# Patient Record
Sex: FEMALE | Race: WHITE | NOT HISPANIC OR LATINO
[De-identification: names, ages, dates, MRNs, and addresses within clinical notes are randomized per-mention and may not be internally consistent; named-entity substitution may affect disease eponyms.]

---

## 2019-09-24 ENCOUNTER — APPOINTMENT (OUTPATIENT)
Dept: OTOLARYNGOLOGY | Facility: CLINIC | Age: 34
End: 2019-09-24
Payer: COMMERCIAL

## 2019-09-24 VITALS
SYSTOLIC BLOOD PRESSURE: 120 MMHG | OXYGEN SATURATION: 96 % | WEIGHT: 185 LBS | HEIGHT: 67 IN | DIASTOLIC BLOOD PRESSURE: 86 MMHG | BODY MASS INDEX: 29.03 KG/M2 | HEART RATE: 75 BPM

## 2019-09-24 PROCEDURE — 31231 NASAL ENDOSCOPY DX: CPT

## 2019-09-24 PROCEDURE — 99204 OFFICE O/P NEW MOD 45 MIN: CPT | Mod: 25

## 2019-09-24 NOTE — PROCEDURE
[Recalcitrant Symptoms] : recalcitrant symptoms  [Anatomical Abnormality] : anatomical abnormality [Anterior rhinoscopy insufficient to account for symptoms] : anterior rhinoscopy insufficient to account for symptoms [Topical Lidocaine] : topical lidocaine [Oxymetazoline HCl] : oxymetazoline HCl [FreeTextEntry6] : Nasal endoscopy performed with surgery 4 mm rigid scope under local anesthesia to evaluate the airway and sinuses. Patient has bilateral deviation of the septum more to the right than left which is limiting her airway. No obvious purulent rhinorrhea noted.

## 2019-09-24 NOTE — ASSESSMENT
[FreeTextEntry1] : Patient is a 33-year-old female referred for sinusitis by Dr. Kee Menendez. Currently patient has had intermittent facial pain and sinusitis over the past several years. Such episodes have been treated with antibiotics in severe cases. She now was evaluated including an MRI which shows scattered opacification of the ethmoid sinus, septal deviation and mucosal thickening lining the maxillary sinus.\par \par At today's visit the head and neck was examined. This included nasal endoscopy which confirms deviation of the septum. No obvious active infection noted.\par \par As patient has imaging findings consistent with sinusitis we have decided to treat her for 3 weeks with Augmentin and a lesser course of steroids. She is to return in 4-5 weeks for CT scan of the sinuses to evaluate response to treatment. See him in the office also recommended.

## 2019-09-24 NOTE — REVIEW OF SYSTEMS
[Patient Intake Form Reviewed] : Patient intake form was reviewed [As Noted in HPI] : as noted in HPI [Negative] : Mouth and Throat

## 2019-10-28 ENCOUNTER — FORM ENCOUNTER (OUTPATIENT)
Age: 34
End: 2019-10-28

## 2019-10-29 ENCOUNTER — APPOINTMENT (OUTPATIENT)
Dept: CT IMAGING | Facility: HOSPITAL | Age: 34
End: 2019-10-29
Payer: COMMERCIAL

## 2019-10-29 ENCOUNTER — APPOINTMENT (OUTPATIENT)
Dept: OTOLARYNGOLOGY | Facility: CLINIC | Age: 34
End: 2019-10-29
Payer: COMMERCIAL

## 2019-10-29 ENCOUNTER — OUTPATIENT (OUTPATIENT)
Dept: OUTPATIENT SERVICES | Facility: HOSPITAL | Age: 34
LOS: 1 days | End: 2019-10-29
Payer: COMMERCIAL

## 2019-10-29 VITALS
BODY MASS INDEX: 29.03 KG/M2 | HEART RATE: 73 BPM | SYSTOLIC BLOOD PRESSURE: 118 MMHG | HEIGHT: 67 IN | WEIGHT: 185 LBS | DIASTOLIC BLOOD PRESSURE: 86 MMHG | OXYGEN SATURATION: 97 %

## 2019-10-29 DIAGNOSIS — Z78.9 OTHER SPECIFIED HEALTH STATUS: ICD-10-CM

## 2019-10-29 DIAGNOSIS — Z80.9 FAMILY HISTORY OF MALIGNANT NEOPLASM, UNSPECIFIED: ICD-10-CM

## 2019-10-29 PROCEDURE — 70486 CT MAXILLOFACIAL W/O DYE: CPT

## 2019-10-29 PROCEDURE — 99215 OFFICE O/P EST HI 40 MIN: CPT | Mod: 25

## 2019-10-29 PROCEDURE — 70486 CT MAXILLOFACIAL W/O DYE: CPT | Mod: 26

## 2019-10-29 PROCEDURE — 31231 NASAL ENDOSCOPY DX: CPT

## 2019-10-29 NOTE — CONSULT LETTER
[Dear  ___] : Dear ~ANDERSON, [Consult Letter:] : I had the pleasure of evaluating your patient, [unfilled]. [Referral Closing:] : Thank you very much for seeing this patient.  If you have any questions, please do not hesitate to contact me. [Sincerely,] : Sincerely, [FreeTextEntry2] : Melvin Wierderkehr, MD

## 2019-10-29 NOTE — ASSESSMENT
[FreeTextEntry1] : Patient returns after extensive treatment by us with both prednisone and antibiotics. She has persistent opacification of the left ethmoid, and frontal sinus which extends partially into the right frontal bone. Patient also has thickened mucous membrane lining the maxillary sinuses bilaterally and one opacified posterior right ethmoid cell was significant septal deviation. Above reviewed with patient and her mother. Given the refractory nature of this disease we would recommend the patient undergo endoscopic ethmoidectomy, bilateral antrostomies, left frontal sinusotomy and image guidance. Informed consent given. We'll wait scheduling with patient.

## 2019-10-29 NOTE — HISTORY OF PRESENT ILLNESS
[de-identified] : 33-year-old female 3 months post delivery note intermittent discomfort particularly over the left frontal region. She has had intermittent sinus discomfort in the past as part of evaluation for above had been told by other physicians this may be due to temporomandibular joint disease as to the current discomfort. No obvious recent history of purulent rhinorrhea. Treatment in the past as needed with antibiotics has been useful. Patient has not undergone prior imaging of the sinuses until his recent problem. Review of MRI shows opacification of several ethmoid cells and the lining of the maxillary sinus. Patient also has deviation of the septum. History is essentially within normal limits. [FreeTextEntry1] : 10--Patient returns after course of both antibiotics and prednisone. Patient had slight improvement in her facial pain at the end of treatment. She'll he thereafter she had return of her facial pain. Patient's CT imaging reviewed today and shows left frontal sinusitis extending into the right aspect of the frontal bone, opacification of the posterior right ethmoid sinus and bilateral ankles so thickening of the maxillary sinus. Patient also has significant deviation of the nasal septum.\par \par Following review of both endoscopy was performed of the nose bilaterally this confirms the septal deviation and patient does have erythema of the mucous membranes consistent with resolving or recurrent infection. No obvious purulent secretions seen.\par \par Given the above we have begun the patient on saline lavaged of her nose twice daily. We will leave her best option is to undergo endoscopic septoplasty, left frontal sinusotomy, bilateral antrostomies and possible right ethmoidectomy given the above endoscopic findings. Patient will also require left anterior ethmoidectomy given the above frontal disease.

## 2019-11-05 ENCOUNTER — APPOINTMENT (OUTPATIENT)
Dept: OTOLARYNGOLOGY | Facility: CLINIC | Age: 34
End: 2019-11-05
Payer: COMMERCIAL

## 2019-11-05 VITALS
BODY MASS INDEX: 28.5 KG/M2 | WEIGHT: 181.6 LBS | HEART RATE: 76 BPM | SYSTOLIC BLOOD PRESSURE: 124 MMHG | HEIGHT: 67 IN | DIASTOLIC BLOOD PRESSURE: 83 MMHG

## 2019-11-05 PROCEDURE — 31231 NASAL ENDOSCOPY DX: CPT

## 2019-11-05 PROCEDURE — 99214 OFFICE O/P EST MOD 30 MIN: CPT | Mod: 25

## 2019-11-05 NOTE — CONSULT LETTER
[Dear  ___] : Dear  [unfilled], [Courtesy Letter:] : I had the pleasure of seeing your patient, [unfilled], in my office today. [Consult Closing:] : Thank you very much for allowing me to participate in the care of this patient.  If you have any questions, please do not hesitate to contact me. [Sincerely,] : Sincerely, [FreeTextEntry3] : Kayode Pineda MD\par Department of Otolaryngology - Head and Neck Surgery\par [facility]

## 2019-11-05 NOTE — DATA REVIEWED
[de-identified] : CT Sinus 10/29/19:\par FINDINGS: \par \par POSTOPERATIVE or POST TRAUMATIC CHANGE: None. \par \par \par SINOCRANIAL AND SINOORBITAL JUNCTIONS: \par The bones adjacent to the sinuses, including the lamina papyracea, \par cribriform plate and fovea ethmoidalis, are intact. \par \par FRONTAL SINUSES, DRAINAGE PATHWAYS AND ASSOCIATED AREAS: \par The right frontal sinus is hypoplastic. There is near complete opacification \par with aerated in the left frontal sinus which extends to the region of the \par right frontal sinus. The left frontal recess is opacified. \par \par NASAL SEPTUM: \par The nasal septum is deviated to the right. \par \par NASAL CAVITY: \par The nasal cavity is well-aerated. \par \par ETHMOID SINUSES: \par There are aerated secretions within the posterior right ethmoid air cell. \par The remainder of the ethmoid air cells are well aerated. \par \par MAXILLARY SINUSES, DRAINAGE PATHWAYS AND ASSOCIATED AREAS: \par There is mild mucosal thickening of the bilateral maxillary sinuses. \par \par The bilateral ostial-meatal units are patent but narrowed by mucosal \par thickening.. \par \par SPHENOID SINUSES, DRAINAGE PATHWAYS AND ASSOCIATED AREAS: \par The bilateral sphenoid sinuses are well aerated. The bilateral sphenoidal \par recesses are patent. The sphenoid septum is deviated to the right. \par \par OTHER: \par Evaluation of the nasopharynx demonstrates no masses. \par \par The mastoid air cells are well aerated. There is no abnormal opacification \par of the tympanic cavities. \par \par Visualized intraorbital compartments, and deep spaces of the suprahyoid neck \par are within normal limits. \par \par Limited evaluation of the brain parenchyma demonstrates no abnormalities. \par \par IMPRESSION: \par \par Left frontal and right posterior ethmoid sinus disease. Obstruction of the \par left frontal recess. \par \par The nasal septum is deviated to the right. \par

## 2019-11-05 NOTE — PROCEDURE
[FreeTextEntry3] : Nasal Endoscopy\par severe right septal deviation\par turbinate hypertrophy\par bulbous left middle turbinate w polypoid mucosal edema\par no mucopus

## 2019-11-05 NOTE — ASSESSMENT
[FreeTextEntry1] : 33F here for initial evaluation. Three months ago, she developed pain over the left frontal region. This involved both sides, but the left was more painful, worse with pressure over her eyebrows/above eyes. The headaches persisted with dull pressure/pain and MRI showed severe left frontal sinus disease (in addition to mild mucosal disease in bilateral maxillary/ethmoid sinuses). She was then seen by ENT who prescribed abx/steroids (cipro/prednisone) and her sx improved, only to recur after the medication finished. There was no green rhinorrhea, or nasal obstruction/congestion. CT sinus done after treatment shows an opacified left frontal sinus and outflow tract; the left frontal sinus pneumatizes into the right frontal bone as well, with a separate hypoplastic right frontal sinus; there is mild bilateral (right>left) maxillary mucosal disease and mild right posterior ethmoid mucosal thickening and severe rightward septal deviation.\par On exam, nasal endoscopy shows severe right septal deviation with a bulbous left middle turbinate with polypoid mucosal edema. The rest of the head and neck exam is unremarkable.\par Frontal pain due to chronic left frontal sinusitis. The right side is also affected since the left frontal sinus extends to the right side. Given the CT, the left frontal outflow tract is anatomically very narrowed due to a large ager nasi cell and suprabullar cell as well.\par She remains symptomatic despite abx/steroids and post-treatment CT continues to show disease. At this point, I would recommend sinus surgery - this would entail left frontal sinusotomy, in addition to bilateral maxillary antrostomies/limited ethmoidectomies and septoplasty for access. This was discussed at length and all questions answered. Plan for OR in the next 6-7 weeks or so. All questions answered. Should sx worsen, will give additional abx/steroids.

## 2019-11-05 NOTE — HISTORY OF PRESENT ILLNESS
[de-identified] : 33F here for initial evaluation.\par \par Three months ago, she developed pain over the left frontal region. This involved both sides, but the left was more painful, worse with pressure over her eyebrows/above eyes. The headaches persisted with dull pressure/pain after which MRI showed severe left frontal sinus disease (in addition to mild mucosal disease in bilateral maxillary/ethmoid sinuses). She was then seen by ENT who prescribed abx/steroids (cipro/prednisone) and her sx dramatically improved, only to recur after the medication finished. CT then done, as below.\par There was no green rhinorrhea, or nasal obstruction/congestion.\par She is here in 2nd opinion, for further evaluation. The frontal pain is impacting her daily life.\par \par CT Sinus 10/29/19:\par opacified left frontal sinus and outflow tract; the sinus extends into the left frontal bone as well, with a separate hypoplastic right frontal sinus; mild bilateral (right>left) maxillary mucosal disease and mild right posterior ethmoid mucosal thickening. Rightward septal deviation\par \par ROS othwerwise unremarkable.

## 2019-12-12 ENCOUNTER — OUTPATIENT (OUTPATIENT)
Dept: OUTPATIENT SERVICES | Facility: HOSPITAL | Age: 34
LOS: 1 days | Discharge: HOME | End: 2019-12-12
Payer: COMMERCIAL

## 2019-12-12 ENCOUNTER — APPOINTMENT (OUTPATIENT)
Dept: OTOLARYNGOLOGY | Facility: HOSPITAL | Age: 34
End: 2019-12-12

## 2019-12-12 ENCOUNTER — RESULT REVIEW (OUTPATIENT)
Age: 34
End: 2019-12-12

## 2019-12-12 PROCEDURE — 31256 EXPLORATION MAXILLARY SINUS: CPT | Mod: 50

## 2019-12-12 PROCEDURE — 31287 NASAL/SINUS ENDOSCOPY SURG: CPT | Mod: LT

## 2019-12-12 PROCEDURE — 30520 REPAIR OF NASAL SEPTUM: CPT

## 2019-12-12 PROCEDURE — 88305 TISSUE EXAM BY PATHOLOGIST: CPT | Mod: 26

## 2019-12-12 PROCEDURE — 88300 SURGICAL PATH GROSS: CPT | Mod: 26,59

## 2019-12-12 PROCEDURE — 30140 RESECT INFERIOR TURBINATE: CPT | Mod: 50

## 2019-12-12 PROCEDURE — 88311 DECALCIFY TISSUE: CPT | Mod: 26

## 2019-12-12 PROCEDURE — 61782 SCAN PROC CRANIAL EXTRA: CPT

## 2019-12-12 PROCEDURE — 31253 NSL/SINS NDSC TOTAL: CPT | Mod: 50

## 2019-12-19 ENCOUNTER — APPOINTMENT (OUTPATIENT)
Dept: OTOLARYNGOLOGY | Facility: CLINIC | Age: 34
End: 2019-12-19
Payer: COMMERCIAL

## 2019-12-19 PROCEDURE — 31237 NSL/SINS NDSC SURG BX POLYPC: CPT | Mod: 58

## 2019-12-19 PROCEDURE — 99024 POSTOP FOLLOW-UP VISIT: CPT

## 2019-12-19 NOTE — HISTORY OF PRESENT ILLNESS
[de-identified] : 34F here in first postoperative visit s/p SMR/ESS (maxillary, ethmoid, left sphenoid, frontal) 12/12/19 for chronic sinusitis and nasal obstruction.\par \par She is doing alright since surgery. She feels very congested with slight headache and has had intermittent bleeding, a bit more yesterday from the right side. She took the medication as prescribed.\par \par Pathology: pending\par \par ROS otherwise unremarkable

## 2019-12-19 NOTE — DATA REVIEWED
[de-identified] : CT Sinus 10/29/19:\par FINDINGS: \par \par POSTOPERATIVE or POST TRAUMATIC CHANGE: None. \par \par \par SINOCRANIAL AND SINOORBITAL JUNCTIONS: \par The bones adjacent to the sinuses, including the lamina papyracea, \par cribriform plate and fovea ethmoidalis, are intact. \par \par FRONTAL SINUSES, DRAINAGE PATHWAYS AND ASSOCIATED AREAS: \par The right frontal sinus is hypoplastic. There is near complete opacification \par with aerated in the left frontal sinus which extends to the region of the \par right frontal sinus. The left frontal recess is opacified. \par \par NASAL SEPTUM: \par The nasal septum is deviated to the right. \par \par NASAL CAVITY: \par The nasal cavity is well-aerated. \par \par ETHMOID SINUSES: \par There are aerated secretions within the posterior right ethmoid air cell. \par The remainder of the ethmoid air cells are well aerated. \par \par MAXILLARY SINUSES, DRAINAGE PATHWAYS AND ASSOCIATED AREAS: \par There is mild mucosal thickening of the bilateral maxillary sinuses. \par \par The bilateral ostial-meatal units are patent but narrowed by mucosal \par thickening.. \par \par SPHENOID SINUSES, DRAINAGE PATHWAYS AND ASSOCIATED AREAS: \par The bilateral sphenoid sinuses are well aerated. The bilateral sphenoidal \par recesses are patent. The sphenoid septum is deviated to the right. \par \par OTHER: \par Evaluation of the nasopharynx demonstrates no masses. \par \par The mastoid air cells are well aerated. There is no abnormal opacification \par of the tympanic cavities. \par \par Visualized intraorbital compartments, and deep spaces of the suprahyoid neck \par are within normal limits. \par \par Limited evaluation of the brain parenchyma demonstrates no abnormalities. \par \par IMPRESSION: \par \par Left frontal and right posterior ethmoid sinus disease. Obstruction of the \par left frontal recess. \par \par The nasal septum is deviated to the right. \par

## 2019-12-19 NOTE — CONSULT LETTER
[Dear  ___] : Dear  [unfilled], [Consult Closing:] : Thank you very much for allowing me to participate in the care of this patient.  If you have any questions, please do not hesitate to contact me. [Courtesy Letter:] : I had the pleasure of seeing your patient, [unfilled], in my office today. [Sincerely,] : Sincerely, [FreeTextEntry3] : Kayode Pineda MD\par Department of Otolaryngology - Head and Neck Surgery\par [facility]

## 2019-12-19 NOTE — ASSESSMENT
[FreeTextEntry1] : 34F here in first postoperative visit s/p SMR/ESS (maxillary, ethmoid, left sphenoid, frontal) 12/12/19 for chronic sinusitis and nasal obstruction. She is doing alright since surgery. She feels very congested with slight headache and has had intermittent bleeding, a bit more yesterday from the right side. She took the medication as prescribed. On exam, there are well healing postoperative changes with patent middle meati, paranasal sinuses and nasal airways.\par She is doing much better after debridement. For now, start mometasone rinses daily. Slowly advance activity. RTO 4 weeks in routine followup.\par

## 2019-12-19 NOTE — PROCEDURE
[FreeTextEntry3] : doyles and propels removed\par \par Nasal Endoscopy:\par coagulum and clot removed\par septum midline and intact\par middle meati patent, left frontal widely patent\par nasal airways widely patent

## 2019-12-20 LAB — SURGICAL PATHOLOGY STUDY: SIGNIFICANT CHANGE UP

## 2020-01-16 ENCOUNTER — APPOINTMENT (OUTPATIENT)
Dept: OTOLARYNGOLOGY | Facility: CLINIC | Age: 35
End: 2020-01-16
Payer: COMMERCIAL

## 2020-01-16 ENCOUNTER — LABORATORY RESULT (OUTPATIENT)
Age: 35
End: 2020-01-16

## 2020-01-16 PROCEDURE — 31231 NASAL ENDOSCOPY DX: CPT | Mod: 58

## 2020-01-16 PROCEDURE — 99024 POSTOP FOLLOW-UP VISIT: CPT

## 2020-01-16 NOTE — ASSESSMENT
[FreeTextEntry1] : 34F here in second postoperative visit s/p SMR/ESS (maxillary, ethmoid, left sphenoid, frontal) 12/12/19 for chronic sinusitis and nasal obstruction.\par She is doing well since last seen 1 month ago. Around 1 week after her first postoperative visit she felt increased facial pressure and drainage and was put on doxycycline by PCP and her sx got a bit better. Today she feels well, much better than preop. Her frontal headache is about 75% improved, but a few days ago was completely gone. She is using mometasone rinses daily.\par On exam, there are well healed postoperative changes with widely patent middle meati; the left frontal sinus is widely patent as are both antrostomies, but there is mucoid drainage from either antrum with underlying mucosal edema; cx were taken.\par She is healing well and overall feels much better; the left frontal is also widely patent. There is mucoid drainage from both maxillaries however, which may just be routine postoperative debris and she just needed debridement. Should sx worsen (or if cx grow anything), I will preemptively start on course of bactrim. Otherwise, doing quite well. RTO 6 weeks. Pt reassured.\par

## 2020-01-16 NOTE — DATA REVIEWED
[de-identified] : CT Sinus 10/29/19:\par FINDINGS: \par \par POSTOPERATIVE or POST TRAUMATIC CHANGE: None. \par \par \par SINOCRANIAL AND SINOORBITAL JUNCTIONS: \par The bones adjacent to the sinuses, including the lamina papyracea, \par cribriform plate and fovea ethmoidalis, are intact. \par \par FRONTAL SINUSES, DRAINAGE PATHWAYS AND ASSOCIATED AREAS: \par The right frontal sinus is hypoplastic. There is near complete opacification \par with aerated in the left frontal sinus which extends to the region of the \par right frontal sinus. The left frontal recess is opacified. \par \par NASAL SEPTUM: \par The nasal septum is deviated to the right. \par \par NASAL CAVITY: \par The nasal cavity is well-aerated. \par \par ETHMOID SINUSES: \par There are aerated secretions within the posterior right ethmoid air cell. \par The remainder of the ethmoid air cells are well aerated. \par \par MAXILLARY SINUSES, DRAINAGE PATHWAYS AND ASSOCIATED AREAS: \par There is mild mucosal thickening of the bilateral maxillary sinuses. \par \par The bilateral ostial-meatal units are patent but narrowed by mucosal \par thickening.. \par \par SPHENOID SINUSES, DRAINAGE PATHWAYS AND ASSOCIATED AREAS: \par The bilateral sphenoid sinuses are well aerated. The bilateral sphenoidal \par recesses are patent. The sphenoid septum is deviated to the right. \par \par OTHER: \par Evaluation of the nasopharynx demonstrates no masses. \par \par The mastoid air cells are well aerated. There is no abnormal opacification \par of the tympanic cavities. \par \par Visualized intraorbital compartments, and deep spaces of the suprahyoid neck \par are within normal limits. \par \par Limited evaluation of the brain parenchyma demonstrates no abnormalities. \par \par IMPRESSION: \par \par Left frontal and right posterior ethmoid sinus disease. Obstruction of the \par left frontal recess. \par \par The nasal septum is deviated to the right. \par

## 2020-01-16 NOTE — CONSULT LETTER
[Dear  ___] : Dear  [unfilled], [Consult Closing:] : Thank you very much for allowing me to participate in the care of this patient.  If you have any questions, please do not hesitate to contact me. [Courtesy Letter:] : I had the pleasure of seeing your patient, [unfilled], in my office today. [FreeTextEntry3] : Kayode Pineda MD\par Department of Otolaryngology - Head and Neck Surgery\par [facility] [Sincerely,] : Sincerely,

## 2020-01-16 NOTE — HISTORY OF PRESENT ILLNESS
[de-identified] : 34F here in second postoperative visit s/p SMR/ESS (maxillary, ethmoid, left sphenoid, frontal) 12/12/19 for chronic sinusitis and nasal obstruction.\par \par She is doing well since last seen. Around 1 week after her first postoperative visit she felt increased facial pressure and drainage and was put on doxycycline by PCP and her sx got a bit better.\par Today she feels well, much better than preop. Her frontal headache is about 75% improved, but a few days ago was completely gone. She is using mometasone rinses.\par \par Pathology:\par 1. Left sphenoid:\par - Chronic sinusitis.\par 2. Left sinus shaving:\par - Chronic sinusitis.\par 3. Left sinus contents:\par - Chronic sinusitis.\par 4. Left frontal sinus:\par - Chronic sinusitis.\par 5. Nasal septum:\par - Cartilage and bone without significant abnormalities on gross examination.\par 6. Right sinus shavings:\par - Chronic sinusitis.\par 7. Right sinus contents:\par - Chronic sinusitis.\par \par ROS otherwise unremarkable

## 2020-01-16 NOTE — PROCEDURE
[FreeTextEntry3] : Nasal Endoscopy:\par mild debris and crusting removed, sent for culture\par nasal airways widely patent, septum intact and midline\par middle meati widely patent, left frontal widely patent no edema\par thick mucoid debris removed from right>left maxillary sinuses w underlying mucosal edema, sent for cx

## 2020-02-27 ENCOUNTER — APPOINTMENT (OUTPATIENT)
Dept: OTOLARYNGOLOGY | Facility: CLINIC | Age: 35
End: 2020-02-27
Payer: COMMERCIAL

## 2020-02-27 ENCOUNTER — LABORATORY RESULT (OUTPATIENT)
Age: 35
End: 2020-02-27

## 2020-02-27 VITALS
HEART RATE: 80 BPM | BODY MASS INDEX: 28.5 KG/M2 | SYSTOLIC BLOOD PRESSURE: 106 MMHG | DIASTOLIC BLOOD PRESSURE: 76 MMHG | WEIGHT: 181.6 LBS | HEIGHT: 67 IN

## 2020-02-27 PROCEDURE — 31231 NASAL ENDOSCOPY DX: CPT | Mod: 58

## 2020-02-27 PROCEDURE — 99024 POSTOP FOLLOW-UP VISIT: CPT

## 2020-02-27 NOTE — DATA REVIEWED
[de-identified] : CT Sinus 10/29/19:\par FINDINGS: \par \par POSTOPERATIVE or POST TRAUMATIC CHANGE: None. \par \par \par SINOCRANIAL AND SINOORBITAL JUNCTIONS: \par The bones adjacent to the sinuses, including the lamina papyracea, \par cribriform plate and fovea ethmoidalis, are intact. \par \par FRONTAL SINUSES, DRAINAGE PATHWAYS AND ASSOCIATED AREAS: \par The right frontal sinus is hypoplastic. There is near complete opacification \par with aerated in the left frontal sinus which extends to the region of the \par right frontal sinus. The left frontal recess is opacified. \par \par NASAL SEPTUM: \par The nasal septum is deviated to the right. \par \par NASAL CAVITY: \par The nasal cavity is well-aerated. \par \par ETHMOID SINUSES: \par There are aerated secretions within the posterior right ethmoid air cell. \par The remainder of the ethmoid air cells are well aerated. \par \par MAXILLARY SINUSES, DRAINAGE PATHWAYS AND ASSOCIATED AREAS: \par There is mild mucosal thickening of the bilateral maxillary sinuses. \par \par The bilateral ostial-meatal units are patent but narrowed by mucosal \par thickening.. \par \par SPHENOID SINUSES, DRAINAGE PATHWAYS AND ASSOCIATED AREAS: \par The bilateral sphenoid sinuses are well aerated. The bilateral sphenoidal \par recesses are patent. The sphenoid septum is deviated to the right. \par \par OTHER: \par Evaluation of the nasopharynx demonstrates no masses. \par \par The mastoid air cells are well aerated. There is no abnormal opacification \par of the tympanic cavities. \par \par Visualized intraorbital compartments, and deep spaces of the suprahyoid neck \par are within normal limits. \par \par Limited evaluation of the brain parenchyma demonstrates no abnormalities. \par \par IMPRESSION: \par \par Left frontal and right posterior ethmoid sinus disease. Obstruction of the \par left frontal recess. \par \par The nasal septum is deviated to the right. \par

## 2020-02-27 NOTE — CONSULT LETTER
[Dear  ___] : Dear  [unfilled], [Courtesy Letter:] : I had the pleasure of seeing your patient, [unfilled], in my office today. [Consult Closing:] : Thank you very much for allowing me to participate in the care of this patient.  If you have any questions, please do not hesitate to contact me. [FreeTextEntry3] : Kayode Pineda MD\par Department of Otolaryngology - Head and Neck Surgery\par [facility] [Sincerely,] : Sincerely,

## 2020-02-27 NOTE — PROCEDURE
[FreeTextEntry3] : Nasal Endoscopy:\par thick green crust and mucus in either nasal airway suctioned, cx sent\par nasal airways widely patent, septum intact and midline\par middle meati patent, polypoid edema and mucoid debris in either maxillary sinus; obstructing polypoid debris in left frontal outflow

## 2020-02-27 NOTE — ASSESSMENT
[FreeTextEntry1] : 34F here in postoperative visit s/p SMR/ESS (maxillary, ethmoid, left sphenoid, frontal) 12/12/19 for chronic sinusitis and nasal obstruction. She continues to have frontal headaches and postnasal drip. The headaches were initially completely resolved after surgery, but have slowly returned, though still improved relative to preoperatively.\par She has had a very atypical postoperative course with increased nasal crusting and drainage and was given two additional courses of abx (doxy, bactrim). At last visit, nasal cx on 1/16/20 grew MRSA (sens to doxy, bactrim) and she did finish course of bactrim. She was using mometasone rinses, but stopped them around 2-3 weeks ago since she felt perhaps they were worsening the frontal pressure. Nasal breathing much improved postoperatively.\par On exam, there are well healed postoperative changes with widely patent middle meati; however, thick green crust and mucus were in either nasal airway with marked polypoid edema and mucoid debris in either maxillary sinus and obstructing polypoid edema in the left frontal outflow.\par She remains quite edematous two months after routine sinus surgery with thick mucoid drainage in either nasal cavity with maxillary and left frontal edema. This is most likely why she remains symptomatic (headache and postnasal drip). Unclear why so swollen at this point. Perhaps could be due to underlying mucosal disease, propel stent use and superimposed URI.\par For now, will treat aggressively - to restart budesonide rinses and will put mupirocin in for topical anti-staph coverage. More importantly, will give prednisone taper for the edema and also a course of bactrim as well given prior cx results. This will pass ultimately and hopefully will expedite postoperative healing. All questions answered; RTO 1 month.

## 2020-02-27 NOTE — HISTORY OF PRESENT ILLNESS
[de-identified] : 34F here in postoperative visit s/p SMR/ESS (maxillary, ethmoid, left sphenoid, frontal) 12/12/19 for chronic sinusitis and nasal obstruction.\par \par She continues to have frontal headaches and postnasal drip. The headaches were initially completely resolved after surgery, but have slowly returned, though still improved relative to preoperatively.\par \par She has had an atypical postoperative course with increased nasal crusting and drainage and was given two additional courses of abx (doxy, bactrim). In office nasal cx 1/16/20 c/w MRSA (sens to doxy, bactrim) She was using mometasone rinses, but stopped them around 2-3 weeks ago since she felt perhaps they were worsening the frontal pressure.\par Nasal breathing much improved postoperatively.\par \par ROS otherwise unremarkable

## 2020-05-28 ENCOUNTER — APPOINTMENT (OUTPATIENT)
Dept: OTOLARYNGOLOGY | Facility: CLINIC | Age: 35
End: 2020-05-28
Payer: COMMERCIAL

## 2020-05-28 VITALS
HEART RATE: 106 BPM | HEIGHT: 67 IN | BODY MASS INDEX: 28.25 KG/M2 | TEMPERATURE: 96.4 F | WEIGHT: 180 LBS | SYSTOLIC BLOOD PRESSURE: 132 MMHG | DIASTOLIC BLOOD PRESSURE: 90 MMHG

## 2020-05-28 DIAGNOSIS — J32.9 CHRONIC SINUSITIS, UNSPECIFIED: ICD-10-CM

## 2020-05-28 PROCEDURE — 99213 OFFICE O/P EST LOW 20 MIN: CPT | Mod: 25

## 2020-05-28 PROCEDURE — 31231 NASAL ENDOSCOPY DX: CPT

## 2020-05-28 NOTE — CONSULT LETTER
[Dear  ___] : Dear  [unfilled], [Courtesy Letter:] : I had the pleasure of seeing your patient, [unfilled], in my office today. [Sincerely,] : Sincerely, [Consult Closing:] : Thank you very much for allowing me to participate in the care of this patient.  If you have any questions, please do not hesitate to contact me. [FreeTextEntry3] : Kayode Pineda MD\par Department of Otolaryngology - Head and Neck Surgery\par [facility]

## 2020-05-28 NOTE — DATA REVIEWED
[de-identified] : CT Sinus 10/29/19:\par FINDINGS: \par \par POSTOPERATIVE or POST TRAUMATIC CHANGE: None. \par \par \par SINOCRANIAL AND SINOORBITAL JUNCTIONS: \par The bones adjacent to the sinuses, including the lamina papyracea, \par cribriform plate and fovea ethmoidalis, are intact. \par \par FRONTAL SINUSES, DRAINAGE PATHWAYS AND ASSOCIATED AREAS: \par The right frontal sinus is hypoplastic. There is near complete opacification \par with aerated in the left frontal sinus which extends to the region of the \par right frontal sinus. The left frontal recess is opacified. \par \par NASAL SEPTUM: \par The nasal septum is deviated to the right. \par \par NASAL CAVITY: \par The nasal cavity is well-aerated. \par \par ETHMOID SINUSES: \par There are aerated secretions within the posterior right ethmoid air cell. \par The remainder of the ethmoid air cells are well aerated. \par \par MAXILLARY SINUSES, DRAINAGE PATHWAYS AND ASSOCIATED AREAS: \par There is mild mucosal thickening of the bilateral maxillary sinuses. \par \par The bilateral ostial-meatal units are patent but narrowed by mucosal \par thickening.. \par \par SPHENOID SINUSES, DRAINAGE PATHWAYS AND ASSOCIATED AREAS: \par The bilateral sphenoid sinuses are well aerated. The bilateral sphenoidal \par recesses are patent. The sphenoid septum is deviated to the right. \par \par OTHER: \par Evaluation of the nasopharynx demonstrates no masses. \par \par The mastoid air cells are well aerated. There is no abnormal opacification \par of the tympanic cavities. \par \par Visualized intraorbital compartments, and deep spaces of the suprahyoid neck \par are within normal limits. \par \par Limited evaluation of the brain parenchyma demonstrates no abnormalities. \par \par IMPRESSION: \par \par Left frontal and right posterior ethmoid sinus disease. Obstruction of the \par left frontal recess. \par \par The nasal septum is deviated to the right. \par

## 2020-05-28 NOTE — HISTORY OF PRESENT ILLNESS
[de-identified] : 34F here in routine followup visit.\par \par She is s/p SMR/ESS (maxillary, ethmoid, left sphenoid, frontal) 12/12/19 for chronic sinusitis, frontal headache and nasal obstruction.\par \par Since last seen 3 months ago, she is doing very well. There is minimal, if any, headache and there is no postnasal drip. She feels very well today. She uses steroid rinses rarely at this point.\par Of note, last month she had covid and recovered without sequelae.\par \par She had an atypical postoperative course with increased nasal crusting and drainage and was given two additional courses of abx (doxy, bactrim) and oral steroids, nothing in >2 months.\par \par ROS otherwise unremarkable

## 2020-05-28 NOTE — PROCEDURE
[FreeTextEntry3] : Nasal Endoscopy:\par nasal airways widely patent, septum intact and midline\par middle meati widely patent, paranasal sinuses and left frontal outflow widely patent\par no mucopus or polyps\par minimal postnasal debris

## 2020-05-28 NOTE — ASSESSMENT
[FreeTextEntry1] : 34F here in routine followup visit. She is s/p SMR/ESS (maxillary, ethmoid, left sphenoid, frontal) 12/12/19 for chronic sinusitis, frontal headache and nasal obstruction. Since last seen 3 months ago, she is doing very well. There is minimal, if any, headache and there is no postnasal drip. She feels very well today. She uses steroid rinses rarely at this point. Of note, last month she had covid and recovered without sequelae.\par She had an atypical postoperative course with increased nasal crusting and drainage and was given two additional courses of abx (doxy, bactrim) and oral steroids, nothing in >2 months.\par On exam, nasal endoscopy shows widely patent nasal airways and paranasal sinuses without mucopurulence or edema.\par She is doing very well and exam shows expected well healed postoperative changes. Unclear why her course was protracted, but for now she has healed very well, her sx are markedly improved and she feels well. Continue current medical regimen. RTO 4-6 months, or sooner, should anything occur in the interim.

## 2020-09-03 ENCOUNTER — APPOINTMENT (OUTPATIENT)
Dept: OTOLARYNGOLOGY | Facility: CLINIC | Age: 35
End: 2020-09-03
Payer: COMMERCIAL

## 2020-09-03 VITALS
DIASTOLIC BLOOD PRESSURE: 92 MMHG | HEIGHT: 67 IN | WEIGHT: 178.4 LBS | SYSTOLIC BLOOD PRESSURE: 131 MMHG | BODY MASS INDEX: 28 KG/M2 | TEMPERATURE: 97.4 F | HEART RATE: 76 BPM

## 2020-09-03 DIAGNOSIS — R51 HEADACHE: ICD-10-CM

## 2020-09-03 DIAGNOSIS — J32.0 CHRONIC MAXILLARY SINUSITIS: ICD-10-CM

## 2020-09-03 DIAGNOSIS — J32.1 CHRONIC FRONTAL SINUSITIS: ICD-10-CM

## 2020-09-03 DIAGNOSIS — J32.2 CHRONIC ETHMOIDAL SINUSITIS: ICD-10-CM

## 2020-09-03 PROCEDURE — 99214 OFFICE O/P EST MOD 30 MIN: CPT | Mod: 25

## 2020-09-03 PROCEDURE — 31231 NASAL ENDOSCOPY DX: CPT

## 2020-09-03 NOTE — DATA REVIEWED
[de-identified] : CT Sinus 10/29/19:\par FINDINGS: \par \par POSTOPERATIVE or POST TRAUMATIC CHANGE: None. \par \par \par SINOCRANIAL AND SINOORBITAL JUNCTIONS: \par The bones adjacent to the sinuses, including the lamina papyracea, \par cribriform plate and fovea ethmoidalis, are intact. \par \par FRONTAL SINUSES, DRAINAGE PATHWAYS AND ASSOCIATED AREAS: \par The right frontal sinus is hypoplastic. There is near complete opacification \par with aerated in the left frontal sinus which extends to the region of the \par right frontal sinus. The left frontal recess is opacified. \par \par NASAL SEPTUM: \par The nasal septum is deviated to the right. \par \par NASAL CAVITY: \par The nasal cavity is well-aerated. \par \par ETHMOID SINUSES: \par There are aerated secretions within the posterior right ethmoid air cell. \par The remainder of the ethmoid air cells are well aerated. \par \par MAXILLARY SINUSES, DRAINAGE PATHWAYS AND ASSOCIATED AREAS: \par There is mild mucosal thickening of the bilateral maxillary sinuses. \par \par The bilateral ostial-meatal units are patent but narrowed by mucosal \par thickening.. \par \par SPHENOID SINUSES, DRAINAGE PATHWAYS AND ASSOCIATED AREAS: \par The bilateral sphenoid sinuses are well aerated. The bilateral sphenoidal \par recesses are patent. The sphenoid septum is deviated to the right. \par \par OTHER: \par Evaluation of the nasopharynx demonstrates no masses. \par \par The mastoid air cells are well aerated. There is no abnormal opacification \par of the tympanic cavities. \par \par Visualized intraorbital compartments, and deep spaces of the suprahyoid neck \par are within normal limits. \par \par Limited evaluation of the brain parenchyma demonstrates no abnormalities. \par \par IMPRESSION: \par \par Left frontal and right posterior ethmoid sinus disease. Obstruction of the \par left frontal recess. \par \par The nasal septum is deviated to the right. \par

## 2020-09-03 NOTE — CONSULT LETTER
[Courtesy Letter:] : I had the pleasure of seeing your patient, [unfilled], in my office today. [Dear  ___] : Dear  [unfilled], [Sincerely,] : Sincerely, [Consult Closing:] : Thank you very much for allowing me to participate in the care of this patient.  If you have any questions, please do not hesitate to contact me. [FreeTextEntry3] : Kyaode Pineda MD\par Department of Otolaryngology - Head and Neck Surgery\par [facility]

## 2020-09-03 NOTE — PROCEDURE
[FreeTextEntry3] : Nasal Endoscopy:\par nasal airways widely patent, septum intact and midline\par MTs w mild polypoid edema but middle meati widely patent, paranasal sinuses and left frontal outflow widely patent; frontal recess widely patent; small retention cyst in lateral recess of frontal sinus, but no purulence or edema.\par no mucopus or polyps\par

## 2020-09-03 NOTE — ASSESSMENT
[FreeTextEntry1] : 34F here in followup visit. She is s/p SMR/ESS (maxillary, ethmoid, left sphenoid, frontal) 12/12/19 for chronic sinusitis, in particular left frontal sinusitis with frontal headache and nasal obstruction.\par She was last seen 3 months ago and at that time had been doing very well with no headache. However, roughly one month ago, she got URI-sx with sore throat, postnasal drip and left sided headache; there was no green drainage nor nasal obstruction. She was given cefdinir. The URI sx resolved, but the left sided headache persisted and she is now here in followup. The left frontal headache is still there, perhaps a little better, but still much better relative to preop. It is described as heaviness and seems to be worse as the day goes on. There is no green mucus, postnasal drip or nasal congestion at all, and never really were.\par On exam, nasal endoscopy shows widely patent nasal airways and paranasal sinuses without mucopurulence or edema. The left frontal recess is widely patent without edema; there is a small retention cyst in the lateral recess of the frontal sinus, but widely patent.\par She has had left sided frontal heaviness/headache for the past month which started after getting over URI. Prior to that it had resolved for several months. It is not nearly as bad as preoperative sx, but nonetheless present. Exam today shows a widely patent left frontal recess and sinus with no e/o obstruction or infection.\par Unclear etiology to sx, especially given paranasal sinus patency on endoscopy. There is a small retention cyst in the left frontal sinus but this should be not cause for sx. There is also no e/o infection either.\par This could be post-infectious pain that is lingering after her recent URI? or, could be headache/facial pain syndrome. Both were discussed. I will give short course prednisone and see if it improves. If not, will get imaging.

## 2020-09-03 NOTE — HISTORY OF PRESENT ILLNESS
[de-identified] : 34F here in followup visit.\par \par She is s/p SMR/ESS (maxillary, ethmoid, left sphenoid, frontal) 12/12/19 for chronic sinusitis, in particular left frontal sinusitis with frontal headache and nasal obstruction.\par \par She was last seen 3 months ago and at that time had been doing very well with no headache. However, roughly one month ago, she got URI-sx with sore throat and postnasal drip with left sided headache; there was no green drainage or nasal obstruction. She was given cefdinir. The URI sx resolved, but the left sided headache persisted and she is now here in followup. The left frontal headache is still there, perhaps a little better, but still much better relative to preop. It is described as heaviness and seems to be worse as the day goes on. There is no green mucus, postnasal drip or nasal congestion at all, and never really were.\par \par She had an atypical postoperative course with increased nasal crusting and drainage and was given two additional courses of abx (doxy, bactrim) and oral steroids, nothing in >2 months.\par \par ROS otherwise unremarkable

## 2022-01-13 ENCOUNTER — NON-APPOINTMENT (OUTPATIENT)
Age: 37
End: 2022-01-13

## 2022-01-14 ENCOUNTER — APPOINTMENT (OUTPATIENT)
Dept: HEART AND VASCULAR | Facility: CLINIC | Age: 37
End: 2022-01-14
Payer: COMMERCIAL

## 2022-01-14 ENCOUNTER — NON-APPOINTMENT (OUTPATIENT)
Age: 37
End: 2022-01-14

## 2022-01-14 VITALS — WEIGHT: 160 LBS | HEIGHT: 67 IN | BODY MASS INDEX: 25.11 KG/M2

## 2022-01-14 VITALS — DIASTOLIC BLOOD PRESSURE: 80 MMHG | SYSTOLIC BLOOD PRESSURE: 128 MMHG

## 2022-01-14 DIAGNOSIS — Z00.00 ENCOUNTER FOR GENERAL ADULT MEDICAL EXAMINATION W/OUT ABNORMAL FINDINGS: ICD-10-CM

## 2022-01-14 DIAGNOSIS — R07.1 CHEST PAIN ON BREATHING: ICD-10-CM

## 2022-01-14 PROCEDURE — 93306 TTE W/DOPPLER COMPLETE: CPT

## 2022-01-14 PROCEDURE — 99204 OFFICE O/P NEW MOD 45 MIN: CPT

## 2022-01-14 PROCEDURE — 36415 COLL VENOUS BLD VENIPUNCTURE: CPT

## 2022-01-14 PROCEDURE — 93000 ELECTROCARDIOGRAM COMPLETE: CPT

## 2022-01-14 RX ORDER — CYCLOBENZAPRINE HYDROCHLORIDE 10 MG
10 KIT MISCELLANEOUS
Refills: 0 | Status: ACTIVE | COMMUNITY

## 2022-01-17 ENCOUNTER — NON-APPOINTMENT (OUTPATIENT)
Age: 37
End: 2022-01-17

## 2022-01-17 PROBLEM — R07.1 CHEST PAIN ON BREATHING: Status: ACTIVE | Noted: 2022-01-14

## 2022-01-17 PROBLEM — Z00.00 ENCOUNTER FOR PREVENTIVE HEALTH EXAMINATION: Status: ACTIVE | Noted: 2019-09-24

## 2022-01-17 LAB
CRP SERPL HS-MCNC: 3.42 MG/L
ERYTHROCYTE [SEDIMENTATION RATE] IN BLOOD BY WESTERGREN METHOD: 5 MM/HR

## 2022-01-17 NOTE — HISTORY OF PRESENT ILLNESS
[FreeTextEntry1] : Patient is a 36-year-old white female with a prior history of sinus disorder and prior sinus surgery.  Patient also suffers from chronic migraines taking monthly injections of a low mag.  Patient had a illness approximately 2 weeks ago characterized by diffuse chest pains some headaches which she could not discern whether it migraine or otherwise she had some flareup of her sinus symptoms.  Since that time she has had ongoing pleuritic type chest pain worse with inspiration with no wheezing shortness of breath with walking orthopnea PND palpitations syncope or near syncope

## 2022-01-17 NOTE — ASSESSMENT
[FreeTextEntry1] : Pleuritic pain post covid \par echo no signs of espinoza/ myocarditis \par CRP esr pending \par reassurance and nsiads prn

## 2022-01-17 NOTE — REVIEW OF SYSTEMS
[Headache] : headache [Feeling Fatigued] : feeling fatigued [SOB] : shortness of breath [Chest Discomfort] : chest discomfort [Negative] : Neurological [Dyspnea on exertion] : not dyspnea during exertion [Cough] : no cough [Wheezing] : no wheezing

## 2022-01-17 NOTE — REASON FOR VISIT
[Symptom and Test Evaluation] : symptom and test evaluation [FreeTextEntry3] : JEANNETTE Armendariz  [FreeTextEntry1] : Pleuritic chest pain

## 2023-11-21 NOTE — HISTORY OF PRESENT ILLNESS
[de-identified] : 33-year-old female 3 months post delivery note intermittent discomfort particularly over the left frontal region. She has had intermittent sinus discomfort in the past as part of evaluation for above had been told by other physicians this may be due to temporomandibular joint disease as to the current discomfort. No obvious recent history of purulent rhinorrhea. Treatment in the past as needed with antibiotics has been useful. Patient has not undergone prior imaging of the sinuses until his recent problem. Review of MRI shows opacification of several ethmoid cells and the lining of the maxillary sinus. Patient also has deviation of the septum. History is essentially within normal limits. 0 = understands/communicates without difficulty